# Patient Record
Sex: MALE | Race: WHITE
[De-identification: names, ages, dates, MRNs, and addresses within clinical notes are randomized per-mention and may not be internally consistent; named-entity substitution may affect disease eponyms.]

---

## 2021-04-27 ENCOUNTER — HOSPITAL ENCOUNTER (OUTPATIENT)
Dept: HOSPITAL 11 - JP.SDS | Age: 81
Discharge: HOME | End: 2021-04-27
Attending: SURGERY
Payer: MEDICARE

## 2021-04-27 VITALS — SYSTOLIC BLOOD PRESSURE: 100 MMHG | DIASTOLIC BLOOD PRESSURE: 64 MMHG

## 2021-04-27 VITALS — HEART RATE: 85 BPM

## 2021-04-27 DIAGNOSIS — K57.30: ICD-10-CM

## 2021-04-27 DIAGNOSIS — Z12.11: Primary | ICD-10-CM

## 2021-04-27 DIAGNOSIS — I10: ICD-10-CM

## 2021-04-28 NOTE — OR
DATE OF PROCEDURE:  04/27/2021

 

SURGEON:  Jagdish Mojica MD

 

PROCEDURE:  Colonoscopy.

 

FINDINGS:

1. Diverticula x1 in sigmoid colon.

2. No other gross abnormalities.

 

COMPLICATIONS:  None.

 

ASSISTANT:  None.

 

ANESTHESIA:  MAC.

 

PREOPERATIVE DIAGNOSIS:  Screening colonoscopy.

 

POSTOPERATIVE DIAGNOSIS:  Screening colonoscopy.

 

RISKS:  Risks, benefits, alternatives, and limitations including, but not limited to

infection, bleeding, perforation and false positives and false negatives were explained to

the patient who wished to proceed.

 

PROCEDURE IN DETAIL:  The patient was placed in left lateral decubitus position.  Digital

rectal exam was performed without abnormality except for internal hemorrhoidal tag.  Scope

was introduced and advanced atraumatically to the ileocecal valve.  A photo was taken of the

appendiceal orifice.

 

The scope was brought back to the ascending, transverse and descending colon, and

retroflexed.

 

No evidence of old or new blood.  No masses.  No polyps.

 

The patient had diverticula with one noted.  No evidence of diverticulitis or bleeding.  No

abnormalities on retroflexion except for the aforementioned tags.  No colitis.  No other

abnormalities.

 

The prep was acceptable.  Approximately 90% of luminal surface could be seen.  Greater than

8 minutes was spent removing the scope.  The patient tolerated the procedure well.

 

 

 

Jagdish Mojica MD

DD:  04/27/2021 08:11:40

DT:  04/27/2021 08:33:18

Job #:  [id number]

## 2021-08-09 ENCOUNTER — HOSPITAL ENCOUNTER (EMERGENCY)
Dept: HOSPITAL 11 - JP.ED | Age: 81
Discharge: NURSING, REHAB OR LONG-TERM CARE | End: 2021-08-09
Payer: MEDICARE

## 2021-08-09 VITALS — DIASTOLIC BLOOD PRESSURE: 47 MMHG | HEART RATE: 82 BPM | SYSTOLIC BLOOD PRESSURE: 96 MMHG

## 2021-08-09 DIAGNOSIS — I25.2: ICD-10-CM

## 2021-08-09 DIAGNOSIS — R79.89: ICD-10-CM

## 2021-08-09 DIAGNOSIS — Z79.899: ICD-10-CM

## 2021-08-09 DIAGNOSIS — Z79.01: ICD-10-CM

## 2021-08-09 DIAGNOSIS — I24.9: Primary | ICD-10-CM

## 2021-08-09 DIAGNOSIS — Z79.02: ICD-10-CM

## 2021-08-09 DIAGNOSIS — I10: ICD-10-CM

## 2021-08-09 DIAGNOSIS — Z88.8: ICD-10-CM

## 2021-08-09 DIAGNOSIS — Z88.0: ICD-10-CM

## 2021-08-09 DIAGNOSIS — Z88.1: ICD-10-CM

## 2021-08-09 PROCEDURE — 80053 COMPREHEN METABOLIC PANEL: CPT

## 2021-08-09 PROCEDURE — 99285 EMERGENCY DEPT VISIT HI MDM: CPT

## 2021-08-09 PROCEDURE — 96374 THER/PROPH/DIAG INJ IV PUSH: CPT

## 2021-08-09 PROCEDURE — 96375 TX/PRO/DX INJ NEW DRUG ADDON: CPT

## 2021-08-09 PROCEDURE — 71046 X-RAY EXAM CHEST 2 VIEWS: CPT

## 2021-08-09 PROCEDURE — 84484 ASSAY OF TROPONIN QUANT: CPT

## 2021-08-09 PROCEDURE — 36415 COLL VENOUS BLD VENIPUNCTURE: CPT

## 2021-08-09 PROCEDURE — 93005 ELECTROCARDIOGRAM TRACING: CPT

## 2021-08-09 PROCEDURE — 85027 COMPLETE CBC AUTOMATED: CPT

## 2021-08-09 NOTE — EDM.PDOC
ED HPI GENERAL MEDICAL PROBLEM





- General


Chief Complaint: Upper Extremity Injury/Pain


Stated Complaint: HEART ATTACK? ARM PAIN


Time Seen by Provider: 08/09/21 15:27


Source of Information: Reports: Patient, RN


History Limitations: Reports: No Limitations





- History of Present Illness


INITIAL COMMENTS - FREE TEXT/NARRATIVE: 





Patient with a history of cirrhosis, heart stents and is currently here for 

right-sided chest pain and arm pain into his elbow.  Patient states he has taken

Mylanta and that did not seem to help.  He did not take his nitro because he did

not think it was heart related chest pain but he was not sure.  He feels the 

pain is more into the muscle versus the bone on the right arm.  He has had no 

sick contacts.  He has not tried anything else to make it better.


Onset: Gradual


Onset Date: 08/08/21


Duration: Getting Worse


Location: Reports: Chest (Right-sided chest and arm pain explains more of a 

burning versus a sharp or stabbing pain)


Quality: Reports: Burning


Severity: Moderate


Improves with: Reports: None


Worsens with: Reports: Movement


Context: Reports: Activity


Associated Symptoms: Reports: Shortness of Breath (Patient states he is always 

short of breath)


Treatments PTA: Reports: Other Medication(s) (Mylanta)


  ** Right Middle Chest


Pain Score (Numeric/FACES): 7





  ** Right Shoulder


Pain Score (Numeric/FACES): 8





- Related Data


                                    Allergies











Allergy/AdvReac Type Severity Reaction Status Date / Time


 


aspirin Allergy  Bleeding Verified 08/02/21 08:33


 


atorvastatin Allergy  Other Verified 08/02/21 08:33


 


Penicillins Allergy  Other Verified 08/02/21 08:33


 


Tetracyclines Allergy  Hives Verified 08/02/21 08:33











Home Meds: 


                                    Home Meds





Clopidogrel [Plavix] 75 mg PO DAILY 07/08/15 [History]


Furosemide 20 mg PO BID 09/05/17 [History]


Potassium Chloride 20 meq PO DAILY 09/05/17 [History]


Spironolactone [Aldactone] 100 mg PO DAILY 09/05/17 [History]


Cholecalciferol (Vitamin D3) [Vitamin D3] 50 mcg PO DAILY 04/26/21 [History]


Cyanocobalamin (Vitamin B-12) [B-12] 1,000 mcg PO DAILY 04/26/21 [History]


Ferrous Sulfate 325 mg PO DAILY 04/26/21 [History]


Melatonin 3 mg PO BEDTIME 04/26/21 [History]


Multivitamin-Min/Iron/FA/Vit K [Multi-Day Plus Minerals Tablet] 1 each PO DAILY 

04/26/21 [History]


Nitroglycerin 0.3 mg SL ASDIRECTED 04/26/21 [History]


Pyridoxine HCl (Vitamin B6) [Vitamin B-6] 500 mg PO DAILY 04/26/21 [History]


Metoprolol Tartrate [Lopressor] 12.5 mg PO Q12HR 07/30/21 [History]











Past Medical History


HEENT History: Reports: Hard of Hearing


Other HEENT History: Lazy left eye


Cardiovascular History: Reports: High Cholesterol, Hypertension, MI, Stents


Respiratory History: Reports: Other (See Below)


Other Respiratory History: pleural effusion


Gastrointestinal History: Reports: Cholelithiasis, Chronic Diarrhea, Cirrhosis, 

Colon Polyp, GERD


Genitourinary History: Reports: None


Musculoskeletal History: Reports: Other (See Below)


Other Musculoskeletal History: right ankle fracture


Neurological History: Reports: Headaches, Chronic


Psychiatric History: Reports: Addiction, Other (See Below)


Other Psychiatric History: hx alcoholism


Endocrine/Metabolic History: Reports: None


Hematologic History: Reports: Anticoagulation Therapy, Blood Transfusion(s)


Immunologic History: Reports: None


Oncologic (Cancer) History: Reports: None


Dermatologic History: Reports: None





- Infectious Disease History


Infectious Disease History: Reports: Chicken Pox, Mumps





- Past Surgical History


HEENT Surgical History: Reports: None


Cardiovascular Surgical History: Reports: Coronary Artery Stent


Other Cardiovascular Surgeries/Procedures: 5 stents


Respiratory Surgical History: Reports: Lung Biopsies, Thoracentesis


GI Surgical History: Reports: Abdominal paracentesis, Cholecystectomy, 

Colonoscopy, Hernia, Abdominal


Male  Surgical History: Reports: None


Endocrine Surgical History: Reports: None


Musculoskeletal Surgical History: Reports: None


Dermatological Surgical History: Reports: None





Social & Family History





- Family History


Family Medical History: No Pertinent Family History





- Tobacco Use


Tobacco Use Status *Q: Never Tobacco User





- Caffeine Use


Caffeine Use: Reports: Coffee, Soda, Tea





- Recreational Drug Use


Recreational Drug Use: No





Review of Systems





- Review of Systems


Review Of Systems: See Below


Constitutional: Reports: Weakness, Other (Underweight)


Eyes: Reports: No Symptoms


Ears: Reports: No Symptoms


Nose: Reports: No Symptoms


Mouth/Throat: Reports: No Symptoms


Respiratory: Reports: Shortness of Breath (Chronic)


Cardiovascular: Reports: Chest Pain (Right sided burning).  Denies: Palpitations


GI/Abdominal: Reports: No Symptoms


Musculoskeletal: Reports: Arm Pain (Right arm pain from his shoulder to his 

elbow more muscular versus joint or bone pain )


Skin: Reports: Jaundice


Neurological: Reports: No Symptoms


Psychiatric: Reports: No Symptoms





ED EXAM, GENERAL





- Physical Exam


Exam: See Below


Exam Limited By: No Limitations


General Appearance: Alert, WD/WN, No Apparent Distress


Neck: Normal Inspection, Supple


Respiratory/Chest: No Respiratory Distress, Lungs Clear, Other (Pain upper right

 chest)


Cardiovascular: Normal Peripheral Pulses, Regular Rate, Rhythm, No Edema


GI/Abdominal: Normal Bowel Sounds, Soft, Distended


Extremities: Arm Pain (Right arm pain mostly muscular).  No: Non-Tender


Neurological: Alert, Oriented, CN II-XII Intact


Psychiatric: Normal Affect, Normal Mood


Skin Exam: Warm, Intact, Normal Color


Lymphatic: No Adenopathy


  ** #1 Interpretation


EKG Date: 08/09/21


Time: 15:39


Rhythm: NSR


Rate (Beats/Min): 93


Axis: Normal


P-Wave: Present


QRS: Normal


ST-T: Elevated (v5-6)


EKG Interpretation Comments: 





sinus rhythm








Course





- Vital Signs


Last Recorded V/S: 


                                Last Vital Signs











Temp  36.7 C   08/09/21 15:46


 


Pulse  82   08/09/21 18:35


 


Resp  22 H  08/09/21 18:26


 


BP  96/47 L  08/09/21 18:35


 


Pulse Ox  98   08/09/21 18:26














- Orders/Labs/Meds


Orders: 


                               Active Orders 24 hr











 Category Date Time Status


 


 EKG 12 Lead [EK] Routine Ther  08/09/21 15:43 Ordered








EKG changes as noted in interpretation


Labs: 


                                Laboratory Tests











  08/09/21 08/09/21 08/09/21 Range/Units





  16:15 16:27 16:27 


 


WBC   5.9   (4.5-11.0)  K/uL


 


RBC   2.91 L   (4.30-5.90)  M/uL


 


Hgb   10.2 L   (12.0-15.0)  g/dL


 


Hct   29.1 L   (40.0-54.0)  %


 


MCV   100 H   (80-98)  fL


 


MCH   35 H   (27-31)  pg


 


MCHC   35   (32-36)  %


 


Plt Count   179   (150-400)  K/uL


 


Sodium    128 L  (140-148)  mmol/L


 


Potassium    6.4 H*  (3.6-5.2)  mmol/L


 


Chloride    98 L  (100-108)  mmol/L


 


Carbon Dioxide    23  (21-32)  mmol/L


 


Anion Gap    13.4  (5.0-14.0)  mmol/L


 


BUN    19 H  (7-18)  mg/dL


 


Creatinine    1.3  (0.8-1.3)  mg/dL


 


Est Cr Clr Drug Dosing    45.16  mL/min


 


Estimated GFR (MDRD)    53 L  (>60)  


 


Glucose    118 H  ()  mg/dL


 


Calcium    8.1 L  (8.5-10.1)  mg/dL


 


Total Bilirubin    1.2 H  (0.2-1.0)  mg/dL


 


AST    56 H  (15-37)  U/L


 


ALT    45  (12-78)  U/L


 


Alkaline Phosphatase    162 H  ()  U/L


 


Troponin I  0.215 H*    (0.000-0.056)  ng/mL


 


Total Protein    7.1  (6.4-8.2)  g/dL


 


Albumin    2.1 L  (3.4-5.0)  g/dL


 


Globulin    5.0 H  (2.3-3.5)  g/dL


 


Albumin/Globulin Ratio    0.4 L  (1.2-2.2)  








Elevated Troponin, Elevated Potassium


Meds: 


Medications














Discontinued Medications














Generic Name Dose Route Start Last Admin





  Trade Name Freq  PRN Reason Stop Dose Admin


 


Aspirin  324 mg  08/09/21 17:58  08/09/21 18:02





  Aspirin 81 Mg Tab.Chew  PO  08/09/21 17:59  324 mg





  ONETIME ONE   Administration


 


Atorvastatin Calcium  40 mg  08/09/21 18:06  08/09/21 18:33





  Atorvastatin 20 Mg Tab  PO  08/09/21 18:07  40 mg





  ONETIME ONE   Administration


 


Heparin Sodium (Porcine)  4,000 units  08/09/21 18:10  08/09/21 18:24





  Heparin Sodium 5,000 Units/Ml Vial  IVPUSH  08/09/21 18:11  4,000 units





  ONETIME ONE   Administration


 


Heparin Sodium/Dextrose  25,000 units in 500 mls @ 16 mls/hr  08/09/21 18:15  

08/09/21 18:24





  Heparin 25,000 Units In D5w 500 Ml  IV   800 units/hr





  TITRATE KARINA   16 mls/hr





    Administration





  Protocol  





  800 UNITS/HR  


 


Nitroglycerin/Dextrose  25 mg in 250 mls @ 6 mls/hr  08/09/21 18:15  08/09/21 

18:30





  Nitroglycerin  25 Mg/D5w 250 Ml  IV   10 mcg/min





  TITRATE KARINA   6 mls/hr





    Administration





  Protocol  





  10 MCG/MIN  


 


Metoprolol Tartrate  25 mg  08/09/21 18:06  08/09/21 18:35





  Metoprolol Tartrate 25 Mg Tab  PO  08/09/21 18:07  Not Given





  ONETIME ONE  














- Radiology Interpretation


Free Text/Narrative:: 





Consult to [facility] -Dr. Mcdaniel.  In agreement elevated troponin 

0.215 and S-T changes on EKg.  Pt needs angiogram.  NO Bed in [facility]





Call to [facility].  Consult Cardiology Dr. Ryan - agreement


Requests Start of Heparin gtt and Heparin Bolus and Drip, Nitro gtt, 

Atorvastatin and metoprolol if pressure can accommodate





Chest xray with atherosclerotic calcification in coronary arteries likely 

scaring and fibrosis as noted per radiology - Incidental finding of 3mm nodular 

density with needed followup in 3 months per radiology recommendation.





- Re-Assessments/Exams


Free Text/Narrative Re-Assessment/Exam: 





08/09/21 18:39


Pt agrees to transfer to higher level of care related to EKG changes and 

elevated troponin.





Departure





- Departure


Time of Disposition: 18:47


Disposition: DC/[facility]fer to Acute [facility]


Condition: Fair


Clinical Impression: 


 Acute coronary syndrome with high troponin








- Discharge Information


Referrals: 


Sharmila Dowling DO [Primary Care Provider] - 


Forms:  ED Department Discharge


Care Plan Goals: 


Pt bolused with heparin and heparin drip started.  Atorvastatin and ASA given 

PER CARDIOLOGIST direction.  Nitro held due to low pressure, Metoprolol held due

to low pressure - Will send both with ambulance crew.  Pt to go to [facility] ER -

Acceptance by Dr. Ryan - Cardiology





Sepsis Event Note (ED)





- Evaluation


Sepsis Screening Result: No Definite Risk





- Focused Exam


Vital Signs: 


                                   Vital Signs











  Temp Pulse Pulse Resp BP BP Pulse Ox


 


 08/09/21 18:35   82    96/47 L  


 


 08/09/21 18:26    84  22 H   99/63  98


 


 08/09/21 18:02    82  16   99/63  97


 


 08/09/21 16:18    89  16   109/69  100


 


 08/09/21 15:48    92  15   107/62  94 L


 


 08/09/21 15:46  36.7 C   102 H  15   114/61  97


 


 08/09/21 15:25  36.7 C   102 H  15   114/61  97














- My Orders


Last 24 Hours: 


My Active Orders





08/09/21 15:43


EKG 12 Lead [EK] Routine 














- Assessment/Plan


Last 24 Hours: 


My Active Orders





08/09/21 15:43


EKG 12 Lead [EK] Routine 











Assessment:: 





Acute Coronary Syndrome with elevated troponin


Plan: 





Transfer to higher level of care - Cardiology Dr. Ryan - [facility]

## 2021-08-09 NOTE — CRLCR
For Patients:  As a result of the 21st Century Cures Act, medical imaging 

exams and procedure reports are released immediately into your electronic 

medical record.  You may view this report before your referring provider.  

If you have questions, please contact your health care provider.



INDICATION: Shortness of breath



TECHNIQUE: Chest radiograph 2 views



COMPARISON: 09/05/2017



FINDINGS: 



Mediastinum: Severe atherosclerotic calcifications are noted in the 

coronary arteries. The heart silhouette is normal in size and morphology. 



Lung: Streaky linear opacities are present the medial right lung base with 

architecture distortion, likely due to scarring and fibrosis. There is a 3 

mm nodular density in the left apex not definitely appreciated on prior 

examination. New blunting of the left costophrenic sulcus is present which 

may be due to small pleural effusion. Chronic blunting of the right lateral 

costophrenic sulcus is noted. No pneumothorax is identified. 



Bone and Soft tissue: Unremarkable for age. 



IMPRESSIONS: 



1. Streaky linear opacities are present the medial right lung base with 

architecture distortion, likely due to scarring and fibrosis.



2. There is a 3 mm nodular density in the left apex not definitely 

appreciated on prior examination. Follow-up chest radiograph in 3 months is 

recommended.



3. New blunting of the left costophrenic sulcus is present which may be due 

to small pleural effusion. Chronic blunting of the right lateral 

costophrenic sulcus is noted.



4. Severe atherosclerotic calcifications are noted in the coronary 

arteries.



Dictated by German Edmondson MD @ 8/9/2021 5:02:26 PM



Dictated by: German Edmondson MD @ 08/09/2021 17:02:30



(Electronically Signed)

## 2021-08-23 ENCOUNTER — HOSPITAL ENCOUNTER (EMERGENCY)
Dept: HOSPITAL 11 - JP.ED | Age: 81
Discharge: HOME | End: 2021-08-23
Payer: MEDICARE

## 2021-08-23 VITALS — SYSTOLIC BLOOD PRESSURE: 106 MMHG | DIASTOLIC BLOOD PRESSURE: 65 MMHG | HEART RATE: 80 BPM

## 2021-08-23 DIAGNOSIS — E78.00: ICD-10-CM

## 2021-08-23 DIAGNOSIS — Z88.1: ICD-10-CM

## 2021-08-23 DIAGNOSIS — Z79.82: ICD-10-CM

## 2021-08-23 DIAGNOSIS — Z88.8: ICD-10-CM

## 2021-08-23 DIAGNOSIS — I10: ICD-10-CM

## 2021-08-23 DIAGNOSIS — Z79.899: ICD-10-CM

## 2021-08-23 DIAGNOSIS — Z79.02: ICD-10-CM

## 2021-08-23 DIAGNOSIS — K21.9: ICD-10-CM

## 2021-08-23 DIAGNOSIS — I25.2: ICD-10-CM

## 2021-08-23 DIAGNOSIS — K72.90: Primary | ICD-10-CM

## 2021-08-23 PROCEDURE — 80053 COMPREHEN METABOLIC PANEL: CPT

## 2021-08-23 PROCEDURE — 36415 COLL VENOUS BLD VENIPUNCTURE: CPT

## 2021-08-23 PROCEDURE — 70450 CT HEAD/BRAIN W/O DYE: CPT

## 2021-08-23 PROCEDURE — 81001 URINALYSIS AUTO W/SCOPE: CPT

## 2021-08-23 PROCEDURE — 82140 ASSAY OF AMMONIA: CPT

## 2021-08-23 PROCEDURE — 85610 PROTHROMBIN TIME: CPT

## 2021-08-23 PROCEDURE — 82550 ASSAY OF CK (CPK): CPT

## 2021-08-23 PROCEDURE — 85027 COMPLETE CBC AUTOMATED: CPT

## 2021-08-23 PROCEDURE — 99285 EMERGENCY DEPT VISIT HI MDM: CPT

## 2021-08-23 PROCEDURE — 74177 CT ABD & PELVIS W/CONTRAST: CPT

## 2021-08-23 PROCEDURE — 80307 DRUG TEST PRSMV CHEM ANLYZR: CPT

## 2021-08-23 NOTE — CT
Head wo Cont

 

CLINICAL HISTORY: Obtunded 

 

COMPARISON: None

 

TECHNIQUE: Transverse scans were obtained from the base of the skull through the

vertex without IV contrast on a multislice, multidetector CT scanner. Auto

dosage reduction and iterative reconstruction techniques employed.

 

FINDINGS: There is a subcentimeter well demarcated low-attenuation focus in the

right thalamus. There is no mass effect, hemorrhage, or extraaxial collection.

The basal cisterns and sulci over the convexities are prominent. The ventricles

are mildly prominent.

 

IMPRESSION: Age-related atrophy

 

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

## 2021-08-23 NOTE — CT
Abdomen Pelvis w Cont

 

CLINICAL HISTORY: Flank hematoma  

 

COMPARISON: None. 

 

TECHNIQUE: Transverse scans were obtained from the base of the lungs to the

pubic symphysis following oral contrast and IV infusion of contrast.Auto dosage

reduction and iterative reconstructiontechniques employed.

 

FINDINGS: There is a large left pleural effusion. There is a small right

effusion. There is patchy right lower lobe airspace disease with an area of

pleural-based consolidation in the medial basal segment. There is significant

abdominal pelvic ascites The liver is small and irregular. There are large

esophageal varices. Patient has a lateral hernia The gallbladder has been

removed. The spleen has a normal size and shape. The pancreas shows no mass..

The adrenal glands appear normal bilaterally . The kidneys show no mass, stones

or hydronephrosis. The ureters are poorly seen. The bladder has a normal

contour. The aorta shows moderate atheromatous change. 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 Iliac arteries are moderately

ectatic.

There is no suspicious retroperitoneal adenopathy. 

Specifically, no abdominal wall retroperitoneal hematoma is identified. The

abdominal pelvic ascites measures fluid density.

 

 

IMPRESSION: Massive abdominal pelvic ascites

 

Large left pleural effusion. There is a small right pleural effusion and

airspace disease and some consolidation in the right lower lobe

 

Cirrhotic changes in the liver with large esophageal varices

 

No hematoma is identified

## 2021-08-23 NOTE — EDM.PDOC
ED HPI GENERAL MEDICAL PROBLEM





- General


Chief Complaint: Neuro Symptoms/Deficits


Stated Complaint: MEDICAL VIA NORTH


Time Seen by Provider: 08/23/21 09:45


Source of Information: Reports: Patient, Family


History Limitations: Reports: Altered Mental Status





- History of Present Illness


INITIAL COMMENTS - FREE TEXT/NARRATIVE: 





This is a 80-year-old male who presents with altered mental status.  History is 

limited due to his altered mentation.  EMS found him in the home after his son 

called to check on him and reports that he was confused prompting him to call 

EMS.  He has a history of liver disease and was recently hospitalized in [place]. 

He is noted to be wearing a life pack.  Patient denies any concerns but only can

say that "hes fine" and report his age.  He was hemodynamically stable for EMS





- Related Data


                                    Allergies











Allergy/AdvReac Type Severity Reaction Status Date / Time


 


aspirin Allergy  Bleeding Verified 08/23/21 11:02


 


atorvastatin Allergy  Other Verified 08/23/21 11:02


 


Penicillins Allergy  Other Verified 08/23/21 11:02


 


Tetracyclines Allergy  Hives Verified 08/23/21 11:02











Home Meds: 


                                    Home Meds





Clopidogrel [Plavix] 75 mg PO DAILY 07/08/15 [History]


Furosemide 2 tab PO DAILY 09/05/17 [History]


Spironolactone [Aldactone] 25 mg PO DAILY 09/05/17 [History]


Cholecalciferol (Vitamin D3) [Vitamin D3] 50 mcg PO DAILY 04/26/21 [History]


Cyanocobalamin (Vitamin B-12) [B-12] 1,000 mcg PO DAILY 04/26/21 [History]


Ferrous Sulfate 325 mg PO DAILY 04/26/21 [History]


Melatonin 6 mg PO BEDTIME 04/26/21 [History]


Multivitamin-Min/Iron/FA/Vit K [Multi-Day Plus Minerals Tablet] 1 each PO DAILY 

04/26/21 [History]


Nitroglycerin 0.3 mg SL ASDIRECTED 04/26/21 [History]


Pyridoxine HCl (Vitamin B6) [Vitamin B-6] 500 mg PO DAILY 04/26/21 [History]


Aspirin 1 tab PO DAILY 08/23/21 [History]


Lactulose 30 ml PO DAILY 08/23/21 [History]


Magnesium Oxide/Mag AA Chelate [Magnesium] 300 mg PO DAILY PRN 08/23/21 

[History]


Metoprolol Succinate 12.5 mg PO DAILY 08/23/21 [History]


Pantoprazole Sodium [Protonix] 40 mg PO ACBREAKFAST 08/23/21 [History]


Rifaximin [Xifaxan] 550 mg PO BID 08/23/21 [History]


lisinopriL [Lisinopril] 2.5 mg PO DAILY 08/23/21 [History]











Past Medical History


HEENT History: Reports: Hard of Hearing


Other HEENT History: Lazy left eye


Cardiovascular History: Reports: High Cholesterol, Hypertension, MI, Stents


Respiratory History: Reports: Other (See Below)


Other Respiratory History: pleural effusion


Gastrointestinal History: Reports: Cholelithiasis, Chronic Diarrhea, Cirrhosis, 

Colon Polyp, GERD, Other (See Below)


Other Gastrointestinal History: varicies


Genitourinary History: Reports: None


Musculoskeletal History: Reports: Other (See Below)


Other Musculoskeletal History: right ankle fracture


Neurological History: Reports: Headaches, Chronic


Psychiatric History: Reports: Addiction, Other (See Below)


Other Psychiatric History: hx alcoholism


Endocrine/Metabolic History: Reports: None


Hematologic History: Reports: Anticoagulation Therapy, Blood Transfusion(s)


Immunologic History: Reports: None


Oncologic (Cancer) History: Reports: None


Dermatologic History: Reports: None





- Infectious Disease History


Infectious Disease History: Reports: Chicken Pox, Mumps





- Past Surgical History


Head Surgeries/Procedures: Reports: None


HEENT Surgical History: Reports: None


Cardiovascular Surgical History: Reports: Coronary Artery Stent


Other Cardiovascular Surgeries/Procedures: 5 stents


Respiratory Surgical History: Reports: Lung Biopsies, Thoracentesis


GI Surgical History: Reports: Abdominal paracentesis, Appendectomy, 

Cholecystectomy, Colonoscopy, Hernia, Abdominal, Other (See Below)


Other GI Surgeries/Procedures: esophogeal varicies banded


Musculoskeletal Surgical History: Reports: None





Social & Family History





- Family History


Family Medical History: No Pertinent Family History





- Tobacco Use


Tobacco Use Status *Q: Never Tobacco User





- Caffeine Use


Caffeine Use: Reports: Soda, Tea





- Recreational Drug Use


Recreational Drug Use: No





ED ROS GENERAL





- Review of Systems


Review Of Systems: Unable To Obtain


Reason Not Obtained: Altered mental status





ED EXAM, NEURO





- Physical Exam


Exam: See Below


Exam Limited By: Altered Mental Status


General Appearance: Obtunded


Ears: Normal External Exam


Nose: Normal Inspection


Throat/Mouth: Normal Inspection


Head Exam: Atraumatic, Normocephalic


Neck: Normal Inspection


Respiratory/Chest: No Respiratory Distress, Lungs Clear


Cardiovascular: Regular Rate, Rhythm


GI/Abdominal: Other (Softly distended, no obvious tenderness, diffuse ecchymosis

 along the right flank.)


Neurological: Other (Only oriented to self, follows basic commands, protecting 

airway, moving all extremities)


Back Exam: Normal Inspection


Extremities: Normal Inspection


Psychiatric: Normal Affect


Skin Exam: Warm, Ecchymosis





Course





- Vital Signs


Last Recorded V/S: 


                                Last Vital Signs











Temp  36.3 C   08/23/21 11:01


 


Pulse  80   08/23/21 14:45


 


Resp  12   08/23/21 14:45


 


BP  106/65   08/23/21 14:45


 


Pulse Ox  95   08/23/21 14:45














- Orders/Labs/Meds


Orders: 


                               Active Orders 24 hr











 Category Date Time Status


 


 Iopamidol [Isovue-300 (61%)] Med  08/23/21 12:06 Active





 100 ml IV .AS DIRECTED PRN   


 


 Add On Test [COMM] Routine Oth  08/23/21 10:57 Ordered








                                Medication Orders





Iopamidol (Iopamidol 612 Mg/Ml 100 Ml Bottle)  100 ml IV .AS DIRECTED PRN


   PRN Reason: RADIOLOGY EXAM


   Stop: 08/24/21 12:07


   Last Admin: 08/23/21 12:27  Dose: 100 ml


   Documented by: [facility]








Labs: 


                                Laboratory Tests











  08/23/21 08/23/21 08/23/21 Range/Units





  10:14 10:14 10:14 


 


WBC  6.8    (4.5-11.0)  K/uL


 


RBC  2.60 L    (4.30-5.90)  M/uL


 


Hgb  8.7 L    (12.0-15.0)  g/dL


 


Hct  25.9 L    (40.0-54.0)  %


 


MCV  100 H    (80-98)  fL


 


MCH  34 H    (27-31)  pg


 


MCHC  34    (32-36)  %


 


Plt Count  190    (150-400)  K/uL


 


PT   12.1 H   (9.5-12.0)  sec


 


INR   1.11   (0.80-1.20)  


 


Sodium    129 L  (140-148)  mmol/L


 


Potassium    4.8  (3.6-5.2)  mmol/L


 


Chloride    98 L  (100-108)  mmol/L


 


Carbon Dioxide    25  (21-32)  mmol/L


 


Anion Gap    10.8  (5.0-14.0)  mmol/L


 


BUN    36 H D  (7-18)  mg/dL


 


Creatinine    1.2  (0.8-1.3)  mg/dL


 


Est Cr Clr Drug Dosing    TNP  


 


Estimated GFR (MDRD)    58 L  (>60)  


 


Glucose    102  ()  mg/dL


 


Calcium    7.9 L  (8.5-10.1)  mg/dL


 


Total Bilirubin    1.2 H  (0.2-1.0)  mg/dL


 


AST    80 H  (15-37)  U/L


 


ALT    64  (12-78)  U/L


 


Alkaline Phosphatase    185 H  ()  U/L


 


Ammonia     (11-32)  umol/L


 


Creatine Kinase    104  ()  U/L


 


Total Protein    6.8  (6.4-8.2)  g/dL


 


Albumin    2.0 L  (3.4-5.0)  g/dL


 


Globulin    4.8 H  (2.3-3.5)  g/dL


 


Albumin/Globulin Ratio    0.4 L  (1.2-2.2)  


 


Urine Color     (YELLOW)  


 


Urine Appearance     (CLEAR)  


 


Urine pH     (5.0-8.0)  


 


Ur Specific Gravity     (1.008-1.030)  


 


Urine Protein     (NEGATIVE)  mg/dL


 


Urine Glucose (UA)     (NEGATIVE)  mg/dL


 


Urine Ketones     (NEGATIVE)  mg/dL


 


Urine Occult Blood     (NEGATIVE)  


 


Urine Nitrite     (NEGATIVE)  


 


Urine Bilirubin     (NEGATIVE)  


 


Urine Urobilinogen     (0.2-1.0)  EU/dL


 


Ur Leukocyte Esterase     (NEGATIVE)  


 


Urine RBC     (0-5)  


 


Urine WBC     (0-5)  


 


Ur Epithelial Cells     


 


Amorphous Sediment     


 


Urine Bacteria     


 


Urine Mucus     


 


Ethyl Alcohol     mg/dL














  08/23/21 08/23/21 08/23/21 Range/Units





  10:14 12:58 14:41 


 


WBC     (4.5-11.0)  K/uL


 


RBC     (4.30-5.90)  M/uL


 


Hgb     (12.0-15.0)  g/dL


 


Hct     (40.0-54.0)  %


 


MCV     (80-98)  fL


 


MCH     (27-31)  pg


 


MCHC     (32-36)  %


 


Plt Count     (150-400)  K/uL


 


PT     (9.5-12.0)  sec


 


INR     (0.80-1.20)  


 


Sodium     (140-148)  mmol/L


 


Potassium     (3.6-5.2)  mmol/L


 


Chloride     (100-108)  mmol/L


 


Carbon Dioxide     (21-32)  mmol/L


 


Anion Gap     (5.0-14.0)  mmol/L


 


BUN     (7-18)  mg/dL


 


Creatinine     (0.8-1.3)  mg/dL


 


Est Cr Clr Drug Dosing     


 


Estimated GFR (MDRD)     (>60)  


 


Glucose     ()  mg/dL


 


Calcium     (8.5-10.1)  mg/dL


 


Total Bilirubin     (0.2-1.0)  mg/dL


 


AST     (15-37)  U/L


 


ALT     (12-78)  U/L


 


Alkaline Phosphatase     ()  U/L


 


Ammonia  65 H    (11-32)  umol/L


 


Creatine Kinase     ()  U/L


 


Total Protein     (6.4-8.2)  g/dL


 


Albumin     (3.4-5.0)  g/dL


 


Globulin     (2.3-3.5)  g/dL


 


Albumin/Globulin Ratio     (1.2-2.2)  


 


Urine Color    Yellow  (YELLOW)  


 


Urine Appearance    Cloudy A  (CLEAR)  


 


Urine pH    6.0  (5.0-8.0)  


 


Ur Specific Gravity    1.020  (1.008-1.030)  


 


Urine Protein    Trace H  (NEGATIVE)  mg/dL


 


Urine Glucose (UA)    Negative  (NEGATIVE)  mg/dL


 


Urine Ketones    Negative  (NEGATIVE)  mg/dL


 


Urine Occult Blood    Negative  (NEGATIVE)  


 


Urine Nitrite    Negative  (NEGATIVE)  


 


Urine Bilirubin    Negative  (NEGATIVE)  


 


Urine Urobilinogen    0.2  (0.2-1.0)  EU/dL


 


Ur Leukocyte Esterase    Negative  (NEGATIVE)  


 


Urine RBC    0-5  (0-5)  


 


Urine WBC    5-10 H  (0-5)  


 


Ur Epithelial Cells    Not seen  


 


Amorphous Sediment    Few  


 


Urine Bacteria    Many  


 


Urine Mucus    Not seen  


 


Ethyl Alcohol   < 3   mg/dL











Meds: 


Medications











Generic Name Dose Route Start Last Admin





  Trade Name Freq  PRN Reason Stop Dose Admin


 


Iopamidol  100 ml  08/23/21 12:06  08/23/21 12:27





  Iopamidol 612 Mg/Ml 100 Ml Bottle  IV  08/24/21 12:07  100 ml





  .AS DIRECTED PRN   Administration





  RADIOLOGY EXAM  














Discontinued Medications














Generic Name Dose Route Start Last Admin





  Trade Name Freq  PRN Reason Stop Dose Admin


 


Sodium Chloride  100 mls @ 3 mls/sec  08/23/21 12:15  08/23/21 12:27





  Normal Saline  IV  08/23/21 12:16  3 mls/sec





  ASDIRECTED KARINA   Administration


 


Lactulose  10 gm  08/23/21 13:46  08/23/21 13:55





  Lactulose Soln 10 Gm/15 Ml 15 Ml Ud Cup  PO  08/23/21 13:47  10 gm





  ONETIME ONE   Administration


 


Sodium Chloride  10 ml  08/23/21 12:06  08/23/21 12:27





  Sodium Chloride 0.9% 10 Ml Sdv  FLUSH  08/23/21 12:07  10 ml





  ONETIME ONE   Administration














- Re-Assessments/Exams


Free Text/Narrative Re-Assessment/Exam: 


80-year-old male with history of alcoholic liver disease presents with 

encephalopathy.


On initial exam is noted to have stable vitals.  Encephalopathic but protecting 

airway, he knows his name but unable to logically answer any other questions.  

No localizing neurologic findings.  Exam shows abdominal distention consistent 

with his known ascites and liver disease, there is some scattered ecchymosis 

along the right flank as noted.





Initial presentation somewhat undifferentiated.  We obtained a head CT which 

without any acute abnormality.  With the signs of trauma on his flank had some 

concern for possible RP bleed or other traumatic intra-abdominal injury so a CT 

of the abdomen was also obtained, this showed significant ascites but no 

evidence of hemorrhage or other acute process.





Labs most remarkable for a elevated ammonia of 65.  Although this is not an 

impressive elevation his symptoms do seem consistent with hyperammonemia and his

 son, who was able to provide further history, reports that he is very sensitive

 to elevated ammonia and likely is missed several doses of his lactulose.  

Ammonia value was in the 30s at his hospitalization several days ago.





Family is very motivated to try and get the patient back home.  They have 

arranged for him to live with a family member while he look for assisted living.

  Patient was able to tolerate his p.o. lactulose in the ED.  We are planning to

 increase this to 2-3 times daily until he is having liquid stools.  I have 

arranged follow-up with his primary care physician as well as the surgery clinic

 for paracentesis.  I think the discharge plan with family is safe, however we 

all acknowledged that he is high risk to need repeat hospitalization in the 

short-term and family knows they can utilize the ED if they were ever feeling 

like he is unsafe.





08/23/21 16:28








Departure





- Departure


Time of Disposition: 16:30


Disposition: Home, Self-Care 01


Clinical Impression: 


 Hepatic encephalopathy








- Discharge Information


Instructions:  Hepatic Encephalopathy


Referrals: 


PCP,None [Primary Care Provider] - 


Forms:  ED Department Discharge


Additional Instructions: 


As we discussed, please increase Leighton's lactulose to 2-3 times daily to 

ensure that he is having regular soft bowel movements.  Continue to follow his 

mental status closely, we would expect some resolution over the coming couple 

days if his confusion is due to elevated ammonia and lack of lactulose therapy.





If you ever feel that Leighton is not safe to be at home, please do return to the

ED as we are happy to reevaluate him and find a safe placement for him.  This 

would likely require hospitalization.





Thank you for trusting us care for you today.





Sepsis Event Note (ED)





- Evaluation


Sepsis Screening Result: No Definite Risk





- Focused Exam


Vital Signs: 


                                   Vital Signs











  Temp Pulse Resp BP Pulse Ox


 


 08/23/21 14:45   80  12  106/65  95


 


 08/23/21 14:15   78  12  107/70  95


 


 08/23/21 13:45   76  12  103/71  95


 


 08/23/21 12:48   71  15  94/51 L 


 


 08/23/21 11:01  36.3 C  74  12  112/53 L  100


 


 08/23/21 10:56  36.3 C  74  12  112/53 L  100


 


 08/23/21 10:00   80  15  112/53 L  96


 


 08/23/21 09:41   77   112/68  98














- My Orders


Last 24 Hours: 


My Active Orders





08/23/21 10:57


Add On Test [COMM] Routine 





08/23/21 12:06


Iopamidol [Isovue-300 (61%)]   100 ml IV .AS DIRECTED PRN 














- Assessment/Plan


Last 24 Hours: 


My Active Orders





08/23/21 10:57


Add On Test [COMM] Routine 





08/23/21 12:06


Iopamidol [Isovue-300 (61%)]   100 ml IV .AS DIRECTED PRN